# Patient Record
Sex: FEMALE | Race: BLACK OR AFRICAN AMERICAN | NOT HISPANIC OR LATINO | Employment: FULL TIME | URBAN - METROPOLITAN AREA
[De-identification: names, ages, dates, MRNs, and addresses within clinical notes are randomized per-mention and may not be internally consistent; named-entity substitution may affect disease eponyms.]

---

## 2020-03-26 ENCOUNTER — HOSPITAL ENCOUNTER (EMERGENCY)
Facility: HOSPITAL | Age: 25
Discharge: HOME | End: 2020-03-26
Payer: MEDICAID

## 2020-03-26 VITALS
HEIGHT: 65 IN | DIASTOLIC BLOOD PRESSURE: 72 MMHG | SYSTOLIC BLOOD PRESSURE: 125 MMHG | WEIGHT: 160 LBS | OXYGEN SATURATION: 100 % | BODY MASS INDEX: 26.66 KG/M2 | RESPIRATION RATE: 16 BRPM | TEMPERATURE: 98 F | HEART RATE: 87 BPM

## 2020-03-26 DIAGNOSIS — S61.211A LACERATION OF LEFT INDEX FINGER WITHOUT FOREIGN BODY WITHOUT DAMAGE TO NAIL, INITIAL ENCOUNTER: Primary | ICD-10-CM

## 2020-03-26 LAB
B-HCG UR QL: NEGATIVE
CTP QC/QA: YES

## 2020-03-26 PROCEDURE — 25000003 PHARM REV CODE 250: Performed by: PHYSICIAN ASSISTANT

## 2020-03-26 PROCEDURE — 12001 RPR S/N/AX/GEN/TRNK 2.5CM/<: CPT

## 2020-03-26 PROCEDURE — 99283 EMERGENCY DEPT VISIT LOW MDM: CPT | Mod: 25

## 2020-03-26 PROCEDURE — 81025 URINE PREGNANCY TEST: CPT | Performed by: PHYSICIAN ASSISTANT

## 2020-03-26 RX ORDER — IBUPROFEN 400 MG/1
800 TABLET ORAL
Status: COMPLETED | OUTPATIENT
Start: 2020-03-26 | End: 2020-03-26

## 2020-03-26 RX ORDER — LIDOCAINE HYDROCHLORIDE 10 MG/ML
5 INJECTION INFILTRATION; PERINEURAL
Status: COMPLETED | OUTPATIENT
Start: 2020-03-26 | End: 2020-03-26

## 2020-03-26 RX ORDER — BACITRACIN ZINC 500 UNIT/G
OINTMENT (GRAM) TOPICAL 2 TIMES DAILY
Qty: 30 G | Refills: 0 | Status: SHIPPED | OUTPATIENT
Start: 2020-03-26

## 2020-03-26 RX ADMIN — IBUPROFEN 800 MG: 400 TABLET, FILM COATED ORAL at 10:03

## 2020-03-26 RX ADMIN — LIDOCAINE HYDROCHLORIDE 5 ML: 10 INJECTION, SOLUTION INFILTRATION; PERINEURAL at 10:03

## 2020-03-26 RX ADMIN — NEOMYCIN-BACITRACIN-POLYMYXIN OINT 1 EACH: OINTMENT at 10:03

## 2020-03-27 NOTE — ED TRIAGE NOTES
Pt presents to ED c/o laceration to L hand index finger. Pt states she cut finger on  at approx 1800. Unsure when last tetanus shot was.

## 2020-03-27 NOTE — DISCHARGE INSTRUCTIONS
Keep area clean and dry.  You may wash gently with mild soap and water and pat dry immediately.  Avoid soaking underwater.  Apply antibiotic ointment twice daily for prevention of infection.  You may take over-the-counter Tylenol and/or ibuprofen as directed as needed for pain relief.  Follow-up with your primary care provider or this department in 7-10 days for suture removal.  Return to the ED for any concerning symptoms.    Our goal in the emergency department is to always give you outstanding care and exceptional service. You may receive a survey by mail or e-mail in the next week regarding your experience in our ED. We would greatly appreciate your completing and returning the survey. Your feedback provides us with a way to recognize our staff who give very good care and it helps us learn how to improve when your experience was below our aspiration of excellence.

## 2020-07-21 ENCOUNTER — HOSPITAL ENCOUNTER (EMERGENCY)
Facility: HOSPITAL | Age: 25
Discharge: HOME | End: 2020-07-21
Attending: EMERGENCY MEDICINE
Payer: MEDICAID

## 2020-07-21 VITALS
WEIGHT: 150 LBS | RESPIRATION RATE: 18 BRPM | DIASTOLIC BLOOD PRESSURE: 68 MMHG | HEART RATE: 57 BPM | BODY MASS INDEX: 26.58 KG/M2 | OXYGEN SATURATION: 100 % | SYSTOLIC BLOOD PRESSURE: 110 MMHG | HEIGHT: 63 IN | TEMPERATURE: 99 F

## 2020-07-21 DIAGNOSIS — N76.0 BV (BACTERIAL VAGINOSIS): Primary | ICD-10-CM

## 2020-07-21 DIAGNOSIS — M54.50 ACUTE RIGHT-SIDED LOW BACK PAIN WITHOUT SCIATICA: ICD-10-CM

## 2020-07-21 DIAGNOSIS — B96.89 BV (BACTERIAL VAGINOSIS): Primary | ICD-10-CM

## 2020-07-21 LAB
B-HCG UR QL: POSITIVE
BACTERIA #/AREA URNS HPF: NORMAL /HPF
BACTERIA GENITAL QL WET PREP: ABNORMAL
BILIRUB UR QL STRIP: NEGATIVE
CLARITY UR: CLEAR
CLUE CELLS VAG QL WET PREP: ABNORMAL
COLOR UR: YELLOW
CTP QC/QA: YES
FILAMENT FUNGI VAG WET PREP-#/AREA: ABNORMAL
GLUCOSE UR QL STRIP: NEGATIVE
HGB UR QL STRIP: ABNORMAL
HYALINE CASTS #/AREA URNS LPF: 0 /LPF
KETONES UR QL STRIP: NEGATIVE
LEUKOCYTE ESTERASE UR QL STRIP: NEGATIVE
MICROSCOPIC COMMENT: NORMAL
NITRITE UR QL STRIP: NEGATIVE
PH UR STRIP: 6 [PH] (ref 5–8)
PROT UR QL STRIP: ABNORMAL
RBC #/AREA URNS HPF: 1 /HPF (ref 0–4)
SP GR UR STRIP: 1.02 (ref 1–1.03)
SPECIMEN SOURCE: ABNORMAL
SQUAMOUS #/AREA URNS HPF: 1 /HPF
T VAGINALIS GENITAL QL WET PREP: ABNORMAL
URN SPEC COLLECT METH UR: ABNORMAL
UROBILINOGEN UR STRIP-ACNC: NEGATIVE EU/DL
WBC #/AREA URNS HPF: 1 /HPF (ref 0–5)
WBC #/AREA VAG WET PREP: ABNORMAL
YEAST GENITAL QL WET PREP: ABNORMAL

## 2020-07-21 PROCEDURE — 99284 EMERGENCY DEPT VISIT MOD MDM: CPT

## 2020-07-21 PROCEDURE — 87491 CHLMYD TRACH DNA AMP PROBE: CPT

## 2020-07-21 PROCEDURE — 81000 URINALYSIS NONAUTO W/SCOPE: CPT

## 2020-07-21 PROCEDURE — 81025 URINE PREGNANCY TEST: CPT | Performed by: PHYSICIAN ASSISTANT

## 2020-07-21 PROCEDURE — 25000003 PHARM REV CODE 250: Performed by: NURSE PRACTITIONER

## 2020-07-21 PROCEDURE — 87210 SMEAR WET MOUNT SALINE/INK: CPT

## 2020-07-21 RX ORDER — ACETAMINOPHEN 500 MG
1000 TABLET ORAL
Status: COMPLETED | OUTPATIENT
Start: 2020-07-21 | End: 2020-07-21

## 2020-07-21 RX ADMIN — ACETAMINOPHEN 1000 MG: 500 TABLET ORAL at 02:07

## 2020-07-21 NOTE — Clinical Note
Jenna Lozada was seen and treated in our emergency department on 7/21/2020.  She may return with limitations on 07/22/2020.  Light duty, no heavy lifting.     Sincerely,      Baldomero Matthews, DNP

## 2020-07-21 NOTE — DISCHARGE INSTRUCTIONS
May use tylenol, heat, massage, biofreeze or capsaicin.  Return to the Emergency department for any worsening or failure to improve, otherwise follow up with your primary care provider.

## 2020-07-21 NOTE — ED TRIAGE NOTES
Pt. Reports right side lower back pain for the past 4 weeks. Pt. States pain intermitting. Pt. Reports she just found out that she is pregnant. Pt. Denies any fall, injuries or trauma, pt. Reports she works at [employer] but does not recall any injuries.

## 2020-07-22 NOTE — ED PROVIDER NOTES
Encounter Date: 7/21/2020       History     Chief Complaint   Patient presents with    Back Pain     muscle spasms.. heavy lifting at work .. lower back spasms.     HPI   Patient is a 25-year-old female who is approximately 5 weeks pregnant who states that she has right mid back pain that she believes may be due to heavy lifting at work.  The pain is exacerbated by movement, she tried icy Hot or similar product home without relief.  Current severity of pain is 10/10.  She denies dysuria, urgency, frequency, hematuria but endorses brown vaginal discharge.  [facility]    Review of patient's allergies indicates:  No Known Allergies  Past Medical History:   Diagnosis Date    Asthma     Bronchitis      History reviewed. No pertinent surgical history.  Family History   Problem Relation Age of Onset    Breast cancer Paternal Grandmother     Depression Neg Hx     Drug abuse Neg Hx     Early death Neg Hx     Hearing loss Neg Hx     Colon cancer Neg Hx     Ovarian cancer Neg Hx      Social History     Tobacco Use    Smoking status: Never Smoker    Smokeless tobacco: Never Used   Substance Use Topics    Alcohol use: No    Drug use: No     Review of Systems   Constitutional: Negative for chills, fatigue and fever.   HENT: Negative for congestion, ear discharge, ear pain, postnasal drip, rhinorrhea, sinus pressure, sneezing, sore throat and voice change.    Eyes: Negative for discharge and itching.   Respiratory: Negative for cough, shortness of breath and wheezing.    Cardiovascular: Negative for chest pain, palpitations and leg swelling.   Gastrointestinal: Negative for abdominal pain, constipation, diarrhea, nausea and vomiting.   Endocrine: Negative for polydipsia, polyphagia and polyuria.   Genitourinary: Positive for vaginal discharge. Negative for dysuria, frequency, hematuria, urgency, vaginal bleeding and vaginal pain.   Musculoskeletal: Positive for back pain.  Negative for arthralgias and myalgias.   Skin: Negative for rash and wound.   Neurological: Negative for dizziness, seizures, syncope, weakness and numbness.   Hematological: Negative for adenopathy. Does not bruise/bleed easily.   Psychiatric/Behavioral: Negative for self-injury and suicidal ideas. The patient is not nervous/anxious.        Physical Exam     Initial Vitals [07/21/20 1350]   BP Pulse Resp Temp SpO2   131/60 78 16 98.7 °F (37.1 °C) 99 %      MAP       --         Physical Exam    Nursing note and vitals reviewed.  Constitutional: She appears well-developed and well-nourished.   HENT:   Head: Normocephalic and atraumatic.   Right Ear: External ear normal.   Left Ear: External ear normal.   Nose: Nose normal.   Eyes: Conjunctivae and EOM are normal. Pupils are equal, round, and reactive to light. Right eye exhibits no discharge. Left eye exhibits no discharge.   Neck: Normal range of motion.   Abdominal: Soft. Normal appearance and bowel sounds are normal. She exhibits no distension. There is no abdominal tenderness. There is no CVA tenderness. Hernia confirmed negative in the right inguinal area and confirmed negative in the left inguinal area.   Genitourinary:    Uterus normal.   No labial fusion. There is no rash, tenderness, lesion or injury on the right labia. There is no rash, tenderness, lesion or injury on the left labia. Uterus is not deviated, not enlarged, not fixed and not tender. Cervix exhibits no motion tenderness, no discharge and no friability. Right adnexum displays no mass, no tenderness and no fullness. Left adnexum displays no mass, no tenderness and no fullness.    Vaginal discharge (white, thick) present.      No vaginal erythema, tenderness or bleeding.   No erythema, tenderness or bleeding in the vagina.    No foreign body in the vagina.      No signs of injury in the vagina.     Musculoskeletal: Normal range of motion.   Neurological: She is alert and oriented to person, place,  and time.   Skin: Skin is dry. Capillary refill takes less than 2 seconds.         ED Course   Procedures  Labs Reviewed   URINALYSIS, REFLEX TO URINE CULTURE - Abnormal; Notable for the following components:       Result Value    Protein, UA 2+ (*)     Occult Blood UA 1+ (*)     All other components within normal limits    Narrative:     Specimen Source->Urine   VAGINAL SCREEN - Abnormal; Notable for the following components:    Clue Cells Few (*)     WBC - Vaginal Screen Moderate (*)     Bacteria - Vaginal Screen Many (*)     All other components within normal limits   POCT URINE PREGNANCY - Abnormal; Notable for the following components:    POC Preg Test, Ur Positive (*)     All other components within normal limits   C. TRACHOMATIS/N. GONORRHOEAE BY AMP DNA   URINALYSIS MICROSCOPIC    Narrative:     Specimen Source->Urine          Imaging Results    None                APC / Resident Notes:   Patient is a 25-year-old female who presents with vaginal discharge and mid right-sided back pain during gestation. She was seen on 7/13/2020 by Dr. Cho, she was found to be rh pos (no need for rhogam), seen on 7/16/2020 by Dr. Montero, an early IUP was demonstrated with gestational sac.  On PE pt is afebrile and nontoxic in nad.  Denies numb/tingling x4 ext, b/b incontinence.  Ddx: pyelo, uti, musculoskeletal back pain, std, vaginal bleeding. I have ordered gc/chlamydia, vag screen, ua, poct upt.              ED Course as of Jul 21 1953   Tue Jul 21, 2020   1405 Preg Test, Ur(!): Positive [VC]   1504 Neg for uti.   Urinalysis Microscopic [VC]   1504 Pending at time of disposition.   C. trachomatis/N. gonorrhoeae by AMP DNA [facility]; Vagina [VC]   1551 Pos for bv, will treat and culture.   Vaginal Screen Vagina(!) [VC]      ED Course User Index  [VC] Baldoemro Matthews DNP     Patient was given Tylenol for the relief of her discomfort.  She is discharged home in good condition and should follow up with her OBGYN for  treatment of bacterial vaginosis at the discretion of the provider.  I explained that Flagyl is the usual treatment for this and it has been problematic and previous studies.  Patient understands she may use Tylenol, topical remedies better over-the-counter, moist heat, massage and follow up with primary care.  She should return for any worsening or changes in condition.See above for analyses of radiology, labs, and events during pt's visit and direct actions taken. Symptomatic therapies and return precautions on AVS.   Medication choices were made after reviewing allergies, medications, history, available laboratories. See below for discharge prescriptions if any and disposition.             Clinical Impression:       ICD-10-CM ICD-9-CM   1. BV (bacterial vaginosis)  N76.0 616.10    B96.89 041.9   2. Acute right-sided low back pain without sciatica  M54.5 724.2         Disposition:   Disposition: Discharged  Condition: Stable     ED Disposition Condition    Discharge Stable        ED Prescriptions     None        Follow-up Information     Follow up With Specialties Details Why Contact Info    Miracle Montero MD Obstetrics and Gynecology Schedule an appointment as soon as possible for a visit  for bacterial vaginosis [address]  [address]  [facility] [id number] [phone]      Andrea Vaughn MD Neonatology Schedule an appointment as soon as possible for a visit  back pain/spasm [address] [facility] [address]  [address]  [facility] [address] [phone]

## 2020-07-27 LAB
C TRACH DNA SPEC QL NAA+PROBE: NOT DETECTED
N GONORRHOEA DNA SPEC QL NAA+PROBE: NOT DETECTED

## 2020-08-06 ENCOUNTER — HOSPITAL ENCOUNTER (EMERGENCY)
Facility: HOSPITAL | Age: 25
Discharge: HOME | End: 2020-08-06
Attending: EMERGENCY MEDICINE
Payer: MEDICAID

## 2020-08-06 VITALS
SYSTOLIC BLOOD PRESSURE: 120 MMHG | TEMPERATURE: 98 F | HEIGHT: 63 IN | OXYGEN SATURATION: 99 % | HEART RATE: 92 BPM | RESPIRATION RATE: 18 BRPM | BODY MASS INDEX: 26.58 KG/M2 | WEIGHT: 150 LBS | DIASTOLIC BLOOD PRESSURE: 56 MMHG

## 2020-08-06 DIAGNOSIS — S61.215A LACERATION OF LEFT RING FINGER WITHOUT FOREIGN BODY WITHOUT DAMAGE TO NAIL, INITIAL ENCOUNTER: Primary | ICD-10-CM

## 2020-08-06 PROCEDURE — 99283 EMERGENCY DEPT VISIT LOW MDM: CPT | Mod: 25

## 2020-08-06 PROCEDURE — 12001 RPR S/N/AX/GEN/TRNK 2.5CM/<: CPT

## 2020-08-06 PROCEDURE — 25000003 PHARM REV CODE 250: Performed by: NURSE PRACTITIONER

## 2020-08-06 RX ORDER — ACETAMINOPHEN 325 MG/1
650 TABLET ORAL
Status: COMPLETED | OUTPATIENT
Start: 2020-08-06 | End: 2020-08-06

## 2020-08-06 RX ORDER — LIDOCAINE HYDROCHLORIDE 10 MG/ML
10 INJECTION INFILTRATION; PERINEURAL
Status: COMPLETED | OUTPATIENT
Start: 2020-08-06 | End: 2020-08-06

## 2020-08-06 RX ADMIN — LIDOCAINE HYDROCHLORIDE 10 ML: 10 INJECTION, SOLUTION INFILTRATION; PERINEURAL at 03:08

## 2020-08-06 RX ADMIN — ACETAMINOPHEN 650 MG: 325 TABLET ORAL at 03:08

## 2020-08-06 NOTE — ED PROVIDER NOTES
Encounter Date: 8/6/2020       History     Chief Complaint   Patient presents with    Laceration     Pt reports she sliced her LEFT ring finger with a box cuter around 1100 today. Bleeding controlled. Pain is 10/10, reports tingling     CC:  Laceration    Ms. Lozada, a 25 y.o. female patient, presents to the ED with a laceration. The patient reports having cut her left, distal, ring finger with a  approx. 1 hour ago.  She stated that it was a new, clean  bleed.  The patient reports bleeding and pain to her affected finger. The patient is currently 7 weeks pregnant, is taking prenatal vitamins, and denies the use of any other medications. The patient denies any allergies to medications and is UTD on immunizations.     The history is provided by the patient. No  was used.     Review of patient's allergies indicates:  No Known Allergies  Past Medical History:   Diagnosis Date    Asthma     Bronchitis      History reviewed. No pertinent surgical history.  Family History   Problem Relation Age of Onset    Breast cancer Paternal Grandmother     Depression Neg Hx     Drug abuse Neg Hx     Early death Neg Hx     Hearing loss Neg Hx     Colon cancer Neg Hx     Ovarian cancer Neg Hx      Social History     Tobacco Use    Smoking status: Never Smoker    Smokeless tobacco: Never Used   Substance Use Topics    Alcohol use: No    Drug use: No     Review of Systems   Constitutional: Negative for fever.   Gastrointestinal: Negative for vomiting.   Musculoskeletal: Negative for arthralgias and back pain.   Skin: Positive for wound (Laceration). Negative for rash.   Neurological: Negative for syncope and numbness.   Psychiatric/Behavioral: Negative for confusion.       Physical Exam     Initial Vitals [08/06/20 1450]   BP Pulse Resp Temp SpO2   (!) 120/56 92 18 98.3 °F (36.8 °C) 99 %      MAP       --         Physical Exam    Nursing note and vitals reviewed.  Constitutional: She  appears well-developed and well-nourished. She is not diaphoretic. She is cooperative.  Non-toxic appearance. No distress.   HENT:   Head: Normocephalic and atraumatic.   Right Ear: External ear normal.   Left Ear: External ear normal.   Mouth/Throat: No trismus in the jaw.   Eyes: Conjunctivae and EOM are normal.   Neck: Normal range of motion. No tracheal deviation present.   Cardiovascular: Normal rate, regular rhythm and intact distal pulses.   Pulses:       Radial pulses are 2+ on the left side.   Pulmonary/Chest: Effort normal and breath sounds normal. No stridor. No tachypnea and no bradypnea. No respiratory distress.   Musculoskeletal: Normal range of motion.   Neurological: She is alert and oriented to person, place, and time. She has normal strength. No sensory deficit. Coordination normal. GCS score is 15. GCS eye subscore is 4. GCS verbal subscore is 5. GCS motor subscore is 6.   Skin: Skin is warm, dry and intact. No bruising and no rash noted. No cyanosis or erythema. Nails show no clubbing.   Psychiatric: She has a normal mood and affect. Her behavior is normal. Judgment and thought content normal.         ED Course   Lac Repair    Date/Time: 8/6/2020 4:00 PM  Performed by: GARO Bales  Authorized by: Robin Monsivais MD   Consent Done: Yes  Consent: Verbal consent obtained.  Risks and benefits: risks, benefits and alternatives were discussed  Consent given by: patient  Patient understanding: patient states understanding of the procedure being performed  Patient identity confirmed: verbally with patient  Body area: upper extremity  Location details: left long finger  Laceration length: 2 cm  Tendon involvement: none  Nerve involvement: none  Anesthesia: digital block    Anesthesia:  Local Anesthetic: lidocaine 1% with epinephrine  Anesthetic total: 3 mL  Patient sedated: no  Preparation: Patient was prepped and draped in the usual sterile fashion.  Irrigation solution: saline  Irrigation  method: syringe  Amount of cleaning: standard  Debridement: none  Degree of undermining: none  Skin closure: 5-0 nylon  Number of sutures: 6  Technique: simple  Approximation: close  Approximation difficulty: simple  Dressing: antibiotic ointment  Patient tolerance: Patient tolerated the procedure well with no immediate complications        Labs Reviewed - No data to display       Imaging Results    None                APC / Resident Notes:   This is an evaluation of a 25 y.o. female that presents to the Emergency Department for a Laceration. Physical Exam shows a non-toxic, afebrile, and well appearing female. There is a laceration to the left distal 4th finger tip (med-lat).  Does not involve the nail bed.  Does not involve the nail plate. There is no surrounding erythema or area of increased warmth.  Finger compartments are soft. There is full ROM of the MCP, PIP, and DIP joints against resistance. Equal sensation to the extremity. No visible tendon lacerations observed on exam.  The wound was irrigated and visually inspected prior to closure. No visible foreign bodies noted on inspection prior to wound closure. Vital Signs Are Reassuring.The wound was closed per the procedure note.     My overall impression is Laceration of the distal finger tip of left 4th finger. I considered, but at this time, do not suspect cellulitis, compartment syndrome, underlying fracture, tendon injury, or suspect any retained foreign body at this time.     ED Course:  Tylenol. D/C Information: Laceration and Suture Removal instructions given. The diagnosis, treatment plan, instructions for follow-up and reevaluation with her PCP or Return to ED for suture removal as well as ED return precautions were discussed and understanding was verbalized. All questions or concerns have been addressed.  NAVID Edwards, FNP-C                            Clinical Impression:       ICD-10-CM ICD-9-CM   1. Laceration of left ring finger without  foreign body without damage to nail, initial encounter  S61.215A 883.0         Disposition:   Disposition: Discharged  Condition: Stable     ED Disposition Condition    Discharge Stable        ED Prescriptions     None        Follow-up Information     Follow up With Specialties Details Why Contact Info    Your Primary Care Doctor  Schedule an appointment as soon as possible for a visit  Please call and schedule an appointment for follow up in 7 days for suture removal     [facility] Emergency [facility] Go to  If symptoms worsen or in 7 days for suture removal [address] [facility] [id number] [phone]                                     Misha Feldman, GATOP  08/06/20 1603

## 2020-08-06 NOTE — ED NOTES
Bed: [facility]  Expected date:   Expected time:   Means of arrival:   Comments:     Demetri Tucker, Patient Care Assistant  08/06/20 7436

## 2020-08-10 ENCOUNTER — PATIENT OUTREACH (OUTPATIENT)
Dept: EMERGENCY MEDICINE | Facility: HOSPITAL | Age: 25
End: 2020-08-10

## 2020-10-16 ENCOUNTER — HOSPITAL ENCOUNTER (EMERGENCY)
Facility: HOSPITAL | Age: 25
Discharge: HOME | End: 2020-10-16
Attending: EMERGENCY MEDICINE
Payer: MEDICAID

## 2020-10-16 VITALS
DIASTOLIC BLOOD PRESSURE: 55 MMHG | OXYGEN SATURATION: 99 % | WEIGHT: 168 LBS | TEMPERATURE: 98 F | RESPIRATION RATE: 18 BRPM | SYSTOLIC BLOOD PRESSURE: 100 MMHG | BODY MASS INDEX: 29.77 KG/M2 | HEIGHT: 63 IN | HEART RATE: 77 BPM

## 2020-10-16 DIAGNOSIS — Z3A.20 20 WEEKS GESTATION OF PREGNANCY: ICD-10-CM

## 2020-10-16 DIAGNOSIS — R10.13 EPIGASTRIC PAIN: Primary | ICD-10-CM

## 2020-10-16 LAB
BILIRUBIN, POC UA: NEGATIVE
BLOOD, POC UA: ABNORMAL
CLARITY, POC UA: CLEAR
COLOR, POC UA: YELLOW
GLUCOSE, POC UA: ABNORMAL
KETONES, POC UA: NEGATIVE
LEUKOCYTE EST, POC UA: NEGATIVE
NITRITE, POC UA: NEGATIVE
PH UR STRIP: 8.5 [PH]
POCT GLUCOSE: 97 MG/DL (ref 70–110)
PROTEIN, POC UA: ABNORMAL
SPECIFIC GRAVITY, POC UA: 1.02
UROBILINOGEN, POC UA: 0.2 E.U./DL

## 2020-10-16 PROCEDURE — 99283 EMERGENCY DEPT VISIT LOW MDM: CPT | Mod: ER

## 2020-10-16 PROCEDURE — 81003 URINALYSIS AUTO W/O SCOPE: CPT | Mod: ER

## 2020-10-16 PROCEDURE — 82962 GLUCOSE BLOOD TEST: CPT | Mod: ER

## 2020-10-16 NOTE — ED PROVIDER NOTES
Encounter Date: 10/16/2020    SCRIBE #1 NOTE: I, Fiona Bernabe, am scribing for, and in the presence of,  GARO Denise. I have scribed the following portions of the note - Other sections scribed: HPI, ROS, PE.       History     Chief Complaint   Patient presents with    Abdominal Pain     EPIGASTRIC PAIN X 30 MINUTES WORSENS WHEN SITTING; DENIES N/V     This is a nontoxic appearing 25 y.o. female with 5/10 epigastric abdominal pain x today after lifting heavy boxes at work. Patient is 5 months pregnant (, P:1, A:0). Endorses nausea. Denies pain at present. Denies vaginal bleeding, dysuria, back pain, and vomiting. Reports seeing OB/GYN Dr. Johnson last week who did an US. Ultrasound was normal.     The history is provided by the patient. No  was used.   Abdominal Pain  The current episode started just prior to arrival. The problem has not changed since onset.The abdominal pain is located in the epigastric region. The abdominal pain does not radiate. The severity of the abdominal pain is 5/10. The abdominal pain is relieved by nothing. The other symptoms of the illness include nausea. The other symptoms of the illness do not include shortness of breath, vomiting, dysuria or vaginal bleeding.   Nausea began today.   The patient states that she believes she is currently not pregnant. The patient has not had a change in bowel habit. Symptoms associated with the illness do not include chills or back pain.     Review of patient's allergies indicates:  No Known Allergies  Past Medical History:   Diagnosis Date    Asthma     Bronchitis      History reviewed. No pertinent surgical history.  Family History   Problem Relation Age of Onset    Breast cancer Paternal Grandmother     Depression Neg Hx     Drug abuse Neg Hx     Early death Neg Hx     Hearing loss Neg Hx     Colon cancer Neg Hx     Ovarian cancer Neg Hx      Social History     Tobacco Use    Smoking status: Never Smoker     Smokeless tobacco: Never Used   Substance Use Topics    Alcohol use: No    Drug use: No     Review of Systems   Constitutional: Negative.  Negative for chills.   HENT: Negative.    Eyes: Negative.    Respiratory: Negative.  Negative for shortness of breath.    Cardiovascular: Negative.  Negative for chest pain.   Gastrointestinal: Positive for abdominal pain and nausea. Negative for vomiting.   Endocrine: Negative.    Genitourinary: Negative.  Negative for dysuria and vaginal bleeding.   Musculoskeletal: Negative.  Negative for back pain.   Skin: Negative.    Allergic/Immunologic: Negative.    Neurological: Negative.    Hematological: Negative.    Psychiatric/Behavioral: Negative.    All other systems reviewed and are negative.      Physical Exam     Initial Vitals [10/16/20 1103]   BP Pulse Resp Temp SpO2   (!) 160/90 77 18 98 °F (36.7 °C) 99 %      MAP       --         Physical Exam    Nursing note and vitals reviewed.  Constitutional: She appears well-developed.   HENT:   Right Ear: External ear normal.   Left Ear: External ear normal.   Nose: Nose normal.   Mouth/Throat: Oropharynx is clear and moist.   Eyes: Conjunctivae are normal.   Neck: Normal range of motion. Neck supple.   Cardiovascular: Normal rate, regular rhythm, S1 normal, S2 normal, normal heart sounds and intact distal pulses.   No murmur heard.  Pulmonary/Chest: Effort normal and breath sounds normal.   Abdominal: Soft. Bowel sounds are normal. There is no abdominal tenderness. There is no guarding.   Musculoskeletal: Normal range of motion. No tenderness or edema.   Neurological: She is alert and oriented to person, place, and time.   Skin: Skin is warm and dry. No rash noted.   Psychiatric: She has a normal mood and affect. Her behavior is normal.         ED Course   Procedures  Labs Reviewed   POCT URINALYSIS W/O SCOPE - Abnormal; Notable for the following components:       Result Value    Glucose, UA Trace (*)     Blood, UA Trace-lysed (*)      Protein, UA 1+ (*)     All other components within normal limits   POCT URINALYSIS W/O SCOPE   POCT GLUCOSE MONITORING CONTINUOUS   POCT GLUCOSE          Imaging Results    None          Medical Decision Making:   History:   Old Medical Records: I decided to obtain old medical records.  Initial Assessment:   25 y.o. female with 5/10 epigastric abdominal pain x today after lifting heavy boxes at work. Patient is 5 months pregnant (, P:1, A:0). Denies pain at present. Denies feeling a similar pain before. Denies vaginal bleeding, dysuria, back pain, and vomiting.  Differential Diagnosis:   Muscle sprain, UTI, pyelonephritis  Clinical Tests:   Lab Tests: Ordered and Reviewed  ED Management:  Physical with no abdominal tenderness. No guarding.   U/A with no evidence of infection.   .  Pt re-examined by Dr. Chappell. She stated that abdominal pain had resolved and she would like to be discharged.   ischarged with Tylenol prn.              Scribe Attestation:   Scribe #1: I performed the above scribed service and the documentation accurately describes the services I performed. I attest to the accuracy of the note.    This document was produced by a scribe under my direction and in my presence. I agree with the content of the note and have made any necessary edits.     GARO Denise    10/17/2020 6:58 PM                  Clinical Impression:     ICD-10-CM ICD-9-CM   1. Epigastric pain  R10.13 789.06   2. 20 weeks gestation of pregnancy  Z3A.20 V22.2                          ED Disposition Condition    Discharge Stable        ED Prescriptions     None        Follow-up Information     Follow up With Specialties Details Why Contact Info    Jose Johnson MD Obstetrics and Gynecology In 2 days  [address]  [address]  [facility] [address]  [phone]                                         GARO Dong  10/17/20 3761

## 2020-11-12 ENCOUNTER — HOSPITAL ENCOUNTER (EMERGENCY)
Facility: HOSPITAL | Age: 25
Discharge: HOME | End: 2020-11-12
Attending: EMERGENCY MEDICINE
Payer: MEDICAID

## 2020-11-12 VITALS
OXYGEN SATURATION: 99 % | HEIGHT: 60 IN | BODY MASS INDEX: 33.38 KG/M2 | TEMPERATURE: 99 F | DIASTOLIC BLOOD PRESSURE: 60 MMHG | WEIGHT: 170 LBS | SYSTOLIC BLOOD PRESSURE: 118 MMHG | HEART RATE: 92 BPM | RESPIRATION RATE: 18 BRPM

## 2020-11-12 DIAGNOSIS — J40 BRONCHITIS: Primary | ICD-10-CM

## 2020-11-12 LAB
BILIRUBIN, POC UA: NEGATIVE
BLOOD, POC UA: NEGATIVE
CLARITY, POC UA: CLEAR
COLOR, POC UA: YELLOW
CTP QC/QA: YES
GLUCOSE, POC UA: NEGATIVE
INFLUENZA A ANTIGEN, POC: NEGATIVE
INFLUENZA B ANTIGEN, POC: NEGATIVE
KETONES, POC UA: NEGATIVE
LEUKOCYTE EST, POC UA: NEGATIVE
NITRITE, POC UA: NEGATIVE
PH UR STRIP: 7.5 [PH]
PROTEIN, POC UA: ABNORMAL
SARS-COV-2 RDRP RESP QL NAA+PROBE: NEGATIVE
SPECIFIC GRAVITY, POC UA: >=1.03
UROBILINOGEN, POC UA: 1 E.U./DL

## 2020-11-12 PROCEDURE — U0002 COVID-19 LAB TEST NON-CDC: HCPCS | Mod: ER | Performed by: EMERGENCY MEDICINE

## 2020-11-12 PROCEDURE — 81003 URINALYSIS AUTO W/O SCOPE: CPT | Mod: ER

## 2020-11-12 PROCEDURE — 87502 INFLUENZA DNA AMP PROBE: CPT | Mod: ER

## 2020-11-12 PROCEDURE — 99284 EMERGENCY DEPT VISIT MOD MDM: CPT | Mod: 25,ER

## 2020-11-12 RX ORDER — ALBUTEROL SULFATE 90 UG/1
1-2 AEROSOL, METERED RESPIRATORY (INHALATION) EVERY 6 HOURS PRN
Qty: 18 G | Refills: 0 | Status: SHIPPED | OUTPATIENT
Start: 2020-11-12 | End: 2021-11-12

## 2020-11-12 RX ORDER — PREDNISONE 50 MG/1
50 TABLET ORAL DAILY
Qty: 5 TABLET | Refills: 0 | Status: SHIPPED | OUTPATIENT
Start: 2020-11-12 | End: 2020-11-17

## 2020-11-12 RX ORDER — CETIRIZINE HYDROCHLORIDE 10 MG/1
10 TABLET ORAL DAILY
Qty: 30 TABLET | Refills: 0 | Status: SHIPPED | OUTPATIENT
Start: 2020-11-12 | End: 2021-11-12

## 2020-11-12 RX ORDER — GUAIFENESIN 100 MG/5ML
100-200 SOLUTION ORAL EVERY 4 HOURS PRN
Qty: 60 ML | Refills: 0 | Status: SHIPPED | OUTPATIENT
Start: 2020-11-12 | End: 2020-11-22

## 2020-11-12 RX ORDER — AZITHROMYCIN 250 MG/1
250 TABLET, FILM COATED ORAL DAILY
Qty: 6 TABLET | Refills: 0 | Status: SHIPPED | OUTPATIENT
Start: 2020-11-12

## 2020-11-12 NOTE — DISCHARGE INSTRUCTIONS
Thank you for coming to our Emergency Department today. It is important to remember that some problems are difficult to diagnose and may not be found during your first visit. Be sure to follow up with your primary care doctor and review any labs/imaging that was performed with them. If you do not have a primary care doctor, you may contact the one listed on your discharge paperwork or you may also call the [facility] Appointment Desk at [phone] to schedule an appointment with one.     All medications may potentially have side effects and it is impossible to predict which medications may give you side effects. If you feel that you are having a negative effect of any medication you should immediately stop taking them and seek medical attention.    Return to the ER with any questions/concerns, new/concerning symptoms, worsening or failure to improve. Do not drive or make any important decisions for 24 hours if you have received any pain medications, sedatives or mood altering drugs during your ER visit.

## 2020-11-12 NOTE — ED PROVIDER NOTES
Encounter Date: 11/12/2020       History     Chief Complaint   Patient presents with    Cough     pt presents to ED with c/o nasal congestion with yellow/green drainage and cough x3 days. Pt is 22wks pregnant but denies any abd, back or pelvic pain at this time.     Nasal Congestion     25 y.o. female Past Medical History:  No date: Asthma  No date: Bronchitis     g2pt currently 22 wks pregnant,     Presents for evaluation of 4-5 days mild headache without neck stiffness/photophobia, +nasal congestion, sore throat, cough prod discolored sputum, sneezing, denies n/v/d, body aches or other symptoms. Pt states she was a long time smoker but quit.        Review of patient's allergies indicates:  No Known Allergies  Past Medical History:   Diagnosis Date    Asthma     Bronchitis      History reviewed. No pertinent surgical history.  Family History   Problem Relation Age of Onset    Breast cancer Paternal Grandmother     Depression Neg Hx     Drug abuse Neg Hx     Early death Neg Hx     Hearing loss Neg Hx     Colon cancer Neg Hx     Ovarian cancer Neg Hx      Social History     Tobacco Use    Smoking status: Never Smoker    Smokeless tobacco: Never Used   Substance Use Topics    Alcohol use: No    Drug use: No     Review of Systems   Constitutional: Negative for fever.   HENT: Positive for sneezing and sore throat.    Respiratory: Positive for cough. Negative for shortness of breath.    Cardiovascular: Negative for chest pain.   Gastrointestinal: Negative for nausea.   Genitourinary: Negative for dysuria.   Musculoskeletal: Negative for back pain.   Skin: Negative for rash.   Neurological: Negative for weakness.   Hematological: Does not bruise/bleed easily.   All other systems reviewed and are negative.      Physical Exam     Initial Vitals [11/12/20 1018]   BP Pulse Resp Temp SpO2   (!) 111/56 102 16 98.3 °F (36.8 °C) 99 %      MAP       --         Physical Exam    Nursing note and vitals  reviewed.  Constitutional: She appears well-developed and well-nourished.   HENT:   Head: Normocephalic and atraumatic.   Eyes: Conjunctivae and EOM are normal. Pupils are equal, round, and reactive to light.   Neck: Normal range of motion.   Cardiovascular: Normal rate and regular rhythm.   Pulmonary/Chest: Breath sounds normal. No respiratory distress.   Abdominal: She exhibits no distension.   Musculoskeletal: Normal range of motion.   Neurological: She is alert. No cranial nerve deficit. GCS score is 15. GCS eye subscore is 4. GCS verbal subscore is 5. GCS motor subscore is 6.   Skin: Skin is warm and dry.   Psychiatric: She has a normal mood and affect. Thought content normal.       Gravid  No resp distress  ED Course   Procedures  Labs Reviewed   POCT URINALYSIS W/O SCOPE - Abnormal; Notable for the following components:       Result Value    Spec Grav UA >=1.030 (*)     Protein, UA 2+ (*)     All other components within normal limits   SARS-COV-2 RDRP GENE   POCT URINALYSIS W/O SCOPE   POCT INFLUENZA A/B MOLECULAR   POCT RAPID INFLUENZA A/B          Imaging Results    None                             will check covid/flu and ua. Pt symptoms c/w bronchitis       Will treat with zithromax, albuterol, prednisone, zyrtec.  Clinical Impression:     ICD-10-CM ICD-9-CM   1. Bronchitis  J40 490                          ED Disposition Condition    Discharge Stable        ED Prescriptions     Medication Sig Dispense Start Date End Date Auth. Provider    predniSONE (DELTASONE) 50 MG Tab Take 1 tablet (50 mg total) by mouth once daily. for 5 days 5 tablet 11/12/2020 11/17/2020 Selam Goldman MD    cetirizine (ZYRTEC) 10 MG tablet Take 1 tablet (10 mg total) by mouth once daily. 30 tablet 11/12/2020 11/12/2021 Selam Goldman MD    guaifenesin 100 mg/5 ml (ROBITUSSIN) 100 mg/5 mL syrup Take 5-10 mLs (100-200 mg total) by mouth every 4 (four) hours as needed for Cough. 60 mL 11/12/2020 11/22/2020  Selam Goldman MD    azithromycin (Z-GABRIELE) 250 MG tablet Take 1 tablet (250 mg total) by mouth once daily. Take first 2 tablets together, then 1 every day until finished. 6 tablet 11/12/2020  Selam Goldman MD    albuterol (PROVENTIL/VENTOLIN HFA) 90 mcg/actuation inhaler Inhale 1-2 puffs into the lungs every 6 (six) hours as needed. Rescue 18 g 11/12/2020 11/12/2021 Selam Goldman MD        Follow-up Information     Follow up With Specialties Details Why Contact Info    Andrea Vaughn MD Neonatology   [address] [place] [id number] [phone]                                         Selam Goldman MD  11/12/20 7541

## 2021-04-16 ENCOUNTER — PATIENT MESSAGE (OUTPATIENT)
Dept: RESEARCH | Facility: HOSPITAL | Age: 26
End: 2021-04-16

## 2021-05-21 NOTE — ED PROVIDER NOTES
Encounter Date: 3/26/2020       History     Chief Complaint   Patient presents with    Laceration     Laceration to L hand index finger, cut on  at approx 1800     Patient is a 25-year-old right-hand-dominant  female with no pertinent past medical history who presents to the ED for urgent evaluation of finger laceration.  Patient reports accidentally cutting the left index finger while using a  about 4 hr prior to arrival.  She reports cleaning the wound and wrapped it with a cotton swab and tape she had at home. She decided to present to the ED for persistent pain. No meds taken. Tetanus UTD. No other complaints, including cough, fever, chest pain, or N/V.     The history is provided by the patient.     Review of patient's allergies indicates:  No Known Allergies  Past Medical History:   Diagnosis Date    Asthma     Bronchitis      History reviewed. No pertinent surgical history.  Family History   Problem Relation Age of Onset    Breast cancer Paternal Grandmother     Depression Neg Hx     Drug abuse Neg Hx     Early death Neg Hx     Hearing loss Neg Hx     Colon cancer Neg Hx     Ovarian cancer Neg Hx      Social History     Tobacco Use    Smoking status: Never Smoker    Smokeless tobacco: Never Used   Substance Use Topics    Alcohol use: No    Drug use: No     Review of Systems   Constitutional: Negative for chills and fever.   HENT: Negative for sore throat.    Eyes: Negative for visual disturbance.   Respiratory: Negative for shortness of breath.    Cardiovascular: Negative for chest pain.   Gastrointestinal: Negative for nausea.   Genitourinary: Negative for dysuria.   Musculoskeletal: Negative for back pain.   Skin: Positive for wound. Negative for rash.   Neurological: Negative for weakness.   Hematological: Does not bruise/bleed easily.   Psychiatric/Behavioral: Negative for dysphoric mood.       Physical Exam     Initial Vitals [03/26/20 2207]   BP Pulse  Resp Temp SpO2   (!) 138/94 88 18 98.4 °F (36.9 °C) 100 %      MAP       --         Physical Exam    Nursing note and vitals reviewed.  Constitutional: She appears well-developed and well-nourished. She is not diaphoretic. No distress.   HENT:   Head: Normocephalic and atraumatic.   Eyes: Conjunctivae and EOM are normal. Pupils are equal, round, and reactive to light.   Neck: Normal range of motion. Neck supple.   Cardiovascular: Normal rate, regular rhythm, normal heart sounds and intact distal pulses.   Pulmonary/Chest: Breath sounds normal. No respiratory distress. She has no wheezes. She has no rales.   Musculoskeletal: Normal range of motion. She exhibits no edema or tenderness.   Well-approximated 2 cm laceration noted to the distal tip of the left 2nd digit. No nail involvement. Patient has full ROM of the finger and intact sensation.    Neurological: She is alert and oriented to person, place, and time. She has normal strength. GCS score is 15. GCS eye subscore is 4. GCS verbal subscore is 5. GCS motor subscore is 6.   Skin: Skin is warm and dry.   Psychiatric: She has a normal mood and affect. Thought content normal.         ED Course   Lac Repair  Date/Time: 3/26/2020 10:39 PM  Performed by: Darrian Moore PA-C  Authorized by: Simran Sullivan PA-C   Body area: upper extremity  Location details: left index finger  Laceration length: 1.5 cm  Foreign bodies: no foreign bodies  Tendon involvement: none  Nerve involvement: none  Vascular damage: no  Anesthesia: digital block    Anesthesia:  Local Anesthetic: lidocaine 1% without epinephrine  Anesthetic total: 2 mL  Patient sedated: no  Preparation: Patient was prepped and draped in the usual sterile fashion.  Irrigation solution: saline  Irrigation method: syringe  Amount of cleaning: standard  Debridement: minimal  Degree of undermining: none  Skin closure: 4-0 nylon  Number of sutures: 3  Technique: simple  Approximation: loose  Approximation difficulty:  Yes simple  Dressing: 4x4 sterile gauze, antibiotic ointment and pressure/compression dressing  Patient tolerance: Patient tolerated the procedure well with no immediate complications        Labs Reviewed   POCT URINE PREGNANCY          Imaging Results    None                APC / Resident Notes:   Pt presenting 2/2 laceration. No signs of NV compromise or arterial bleeding. Laceration was repaired without difficulty. Please see procedure note. Pain controlled, tolerating po, and able to ambulate. Patient instructed to return in 7-10 days for suture removal. No foreign bodies noted.   Cautious return precautions discussed with patient and/or family with understanding. Prompt f/u with primary care physician discussed. All questions answered. Patient comfortable with plan.                               Clinical Impression:       ICD-10-CM ICD-9-CM   1. Laceration of left index finger without foreign body without damage to nail, initial encounter S61.211A 883.0         Disposition:   Disposition: Discharged  Condition: Stable                        Simran Sullivan PA-C  03/26/20 8295

## 2021-08-09 ENCOUNTER — LAB VISIT (OUTPATIENT)
Dept: PRIMARY CARE CLINIC | Facility: CLINIC | Age: 26
End: 2021-08-09
Payer: MEDICAID

## 2021-08-09 DIAGNOSIS — Z20.822 ENCOUNTER FOR LABORATORY TESTING FOR COVID-19 VIRUS: ICD-10-CM

## 2021-08-09 PROCEDURE — U0005 INFEC AGEN DETEC AMPLI PROBE: HCPCS | Performed by: INTERNAL MEDICINE

## 2021-08-09 PROCEDURE — U0003 INFECTIOUS AGENT DETECTION BY NUCLEIC ACID (DNA OR RNA); SEVERE ACUTE RESPIRATORY SYNDROME CORONAVIRUS 2 (SARS-COV-2) (CORONAVIRUS DISEASE [COVID-19]), AMPLIFIED PROBE TECHNIQUE, MAKING USE OF HIGH THROUGHPUT TECHNOLOGIES AS DESCRIBED BY CMS-2020-01-R: HCPCS | Performed by: INTERNAL MEDICINE

## 2021-08-10 LAB
SARS-COV-2 RNA RESP QL NAA+PROBE: DETECTED
SARS-COV-2- CYCLE NUMBER: 20.44

## 2021-08-11 DIAGNOSIS — U07.1 COVID-19 VIRUS DETECTED: ICD-10-CM

## 2021-12-30 ENCOUNTER — LAB VISIT (OUTPATIENT)
Dept: PRIMARY CARE CLINIC | Facility: OTHER | Age: 26
End: 2021-12-30
Attending: INTERNAL MEDICINE
Payer: MEDICAID

## 2021-12-30 DIAGNOSIS — Z20.822 ENCOUNTER FOR LABORATORY TESTING FOR COVID-19 VIRUS: ICD-10-CM

## 2021-12-30 PROCEDURE — U0003 INFECTIOUS AGENT DETECTION BY NUCLEIC ACID (DNA OR RNA); SEVERE ACUTE RESPIRATORY SYNDROME CORONAVIRUS 2 (SARS-COV-2) (CORONAVIRUS DISEASE [COVID-19]), AMPLIFIED PROBE TECHNIQUE, MAKING USE OF HIGH THROUGHPUT TECHNOLOGIES AS DESCRIBED BY CMS-2020-01-R: HCPCS | Performed by: INTERNAL MEDICINE

## 2021-12-31 ENCOUNTER — PATIENT MESSAGE (OUTPATIENT)
Dept: ADMINISTRATIVE | Facility: OTHER | Age: 26
End: 2021-12-31
Payer: MEDICAID

## 2022-01-01 LAB
SARS-COV-2 RNA RESP QL NAA+PROBE: NOT DETECTED
SARS-COV-2- CYCLE NUMBER: NORMAL

## 2022-02-04 ENCOUNTER — HOSPITAL ENCOUNTER (OUTPATIENT)
Facility: HOSPITAL | Age: 27
Discharge: HOME | End: 2022-02-04
Attending: OBSTETRICS & GYNECOLOGY | Admitting: OBSTETRICS & GYNECOLOGY
Payer: MEDICAID

## 2022-02-04 VITALS
RESPIRATION RATE: 18 BRPM | SYSTOLIC BLOOD PRESSURE: 108 MMHG | OXYGEN SATURATION: 100 % | TEMPERATURE: 98 F | DIASTOLIC BLOOD PRESSURE: 62 MMHG | HEART RATE: 83 BPM

## 2022-02-04 DIAGNOSIS — R10.13 EPIGASTRIC ABDOMINAL PAIN: ICD-10-CM

## 2022-02-04 PROCEDURE — 59025 FETAL NON-STRESS TEST: CPT

## 2022-02-04 PROCEDURE — 99211 OFF/OP EST MAY X REQ PHY/QHP: CPT | Mod: 25

## 2022-02-04 PROCEDURE — 25000003 PHARM REV CODE 250: Performed by: OBSTETRICS & GYNECOLOGY

## 2022-02-04 RX ORDER — FAMOTIDINE 20 MG/1
40 TABLET, FILM COATED ORAL ONCE
Status: COMPLETED | OUTPATIENT
Start: 2022-02-04 | End: 2022-02-04

## 2022-02-04 RX ORDER — DIPHENHYDRAMINE HCL 25 MG
25 CAPSULE ORAL ONCE
Status: DISCONTINUED | OUTPATIENT
Start: 2022-02-04 | End: 2022-02-04 | Stop reason: HOSPADM

## 2022-02-04 RX ADMIN — FAMOTIDINE 40 MG: 20 TABLET ORAL at 03:02

## 2022-02-04 NOTE — NURSING
0300On arrival pt had diarrhea and sat in the restroom. Was instructed to hit call bell when done. FHM placed and pt given pepcid.     0344: No bowel movement since 0300

## 2022-02-04 NOTE — DISCHARGE INSTRUCTIONS
Home Undelivered Discharge Instructions    After Discharge Orders:    No future appointments.    Call physician to follow up and for instructions     Current Discharge Medication List      CONTINUE these medications which have NOT CHANGED    Details   albuterol (ACCUNEB) 0.63 mg/3 mL Nebu Take 0.63 mg by nebulization every 6 (six) hours as needed.      azithromycin (Z-GABRIELE) 250 MG tablet Take 1 tablet (250 mg total) by mouth once daily. Take first 2 tablets together, then 1 every day until finished.  Qty: 6 tablet, Refills: 0      bacitracin 500 unit/gram Oint Apply topically 2 (two) times daily.  Qty: 30 g, Refills: 0      cetirizine (ZYRTEC) 10 MG tablet Take 1 tablet (10 mg total) by mouth once daily.  Qty: 30 tablet, Refills: 0      ferrous sulfate 325 mg (65 mg iron) Tab tablet Take 1 tablet (325 mg total) by mouth once daily.  Qty: 30 tablet, Refills: 5      ibuprofen (ADVIL,MOTRIN) 600 MG tablet Take 1 tablet (600 mg total) by mouth every 8 (eight) hours as needed for Pain.  Qty: 42 tablet, Refills: 0      norethindrone-ethinyl estradiol (LOESTRIN 1/20, 21,) 1-20 mg-mcg per tablet Take 1 tablet by mouth once daily.  Qty: 30 tablet, Refills: 6                     · Diet:  normal diet as tolerated    · Rest: normal activity as tolerated    Other instructions: Do kick counts once a day on your baby. Choose the time of day your baby is most active. Get in a comfortable lying or sitting position and time how long it takes to feel 10 kicks, twists, turns, swishes, or rolls. Call your physician or midwife if there have not been 10 kicks in 1 hours    Call physician or midwife, return to Labor and Delivery, call 911, or go to the nearest Emergency Room if: increased leakage or fluid, contractions more than  6 per  1 hour, decreased fetal movement, persistent low back pain or cramping, bleeding from vaginal area, difficulty urinating, pain with urination, difficulty breathing, new calf pain, persistent headache or  vision change

## 2022-02-04 NOTE — NURSING
Pt arrived to unit w/ complaints of epigastric burning since yesterday and diarrhea. Pt has been taking immodium and tums w/ no relief. Pt unreferred from [facility], seeing Dr. Fontaine. Report given to Dr. Lipscomb, orders to give pt pepcid, 40mg, PO, now. NST, check cervix, and administer benadryl before d/c home.

## 2022-02-04 NOTE — NURSING
NST reactive. SVE closed/thick/high.Pt advised to follow up w/ MD tomorrow morning. Pepcid helped and pt feels she can sleep. Pt refused benadryl. Pt d/c from monitors and asked to await d/c instructions.

## 2022-05-27 ENCOUNTER — HOSPITAL ENCOUNTER (EMERGENCY)
Facility: HOSPITAL | Age: 27
Discharge: HOME | End: 2022-05-27
Attending: EMERGENCY MEDICINE
Payer: MEDICAID

## 2022-05-27 VITALS
OXYGEN SATURATION: 100 % | BODY MASS INDEX: 32.39 KG/M2 | WEIGHT: 165 LBS | TEMPERATURE: 98 F | DIASTOLIC BLOOD PRESSURE: 73 MMHG | RESPIRATION RATE: 18 BRPM | HEIGHT: 60 IN | HEART RATE: 62 BPM | SYSTOLIC BLOOD PRESSURE: 134 MMHG

## 2022-05-27 DIAGNOSIS — J02.9 PHARYNGITIS, UNSPECIFIED ETIOLOGY: Primary | ICD-10-CM

## 2022-05-27 LAB
B-HCG UR QL: NEGATIVE
CTP QC/QA: YES
CTP QC/QA: YES
MOLECULAR STREP A: NEGATIVE

## 2022-05-27 PROCEDURE — 81025 URINE PREGNANCY TEST: CPT | Performed by: EMERGENCY MEDICINE

## 2022-05-27 PROCEDURE — 63600175 PHARM REV CODE 636 W HCPCS: Performed by: PHYSICIAN ASSISTANT

## 2022-05-27 PROCEDURE — 96372 THER/PROPH/DIAG INJ SC/IM: CPT | Performed by: PHYSICIAN ASSISTANT

## 2022-05-27 PROCEDURE — 99284 EMERGENCY DEPT VISIT MOD MDM: CPT | Mod: 25

## 2022-05-27 PROCEDURE — 87880 STREP A ASSAY W/OPTIC: CPT

## 2022-05-27 RX ORDER — PREDNISONE 20 MG/1
40 TABLET ORAL DAILY
Qty: 6 TABLET | Refills: 0 | Status: SHIPPED | OUTPATIENT
Start: 2022-05-27 | End: 2022-05-30

## 2022-05-27 RX ORDER — MELOXICAM 7.5 MG/1
7.5 TABLET ORAL DAILY
Qty: 12 TABLET | Refills: 0 | Status: SHIPPED | OUTPATIENT
Start: 2022-05-27

## 2022-05-27 RX ORDER — DEXAMETHASONE SODIUM PHOSPHATE 4 MG/ML
12 INJECTION, SOLUTION INTRA-ARTICULAR; INTRALESIONAL; INTRAMUSCULAR; INTRAVENOUS; SOFT TISSUE
Status: COMPLETED | OUTPATIENT
Start: 2022-05-27 | End: 2022-05-27

## 2022-05-27 RX ORDER — KETOROLAC TROMETHAMINE 30 MG/ML
30 INJECTION, SOLUTION INTRAMUSCULAR; INTRAVENOUS
Status: COMPLETED | OUTPATIENT
Start: 2022-05-27 | End: 2022-05-27

## 2022-05-27 RX ADMIN — DEXAMETHASONE SODIUM PHOSPHATE 12 MG: 4 INJECTION INTRA-ARTICULAR; INTRALESIONAL; INTRAMUSCULAR; INTRAVENOUS; SOFT TISSUE at 06:05

## 2022-05-27 RX ADMIN — KETOROLAC TROMETHAMINE 30 MG: 30 INJECTION, SOLUTION INTRAMUSCULAR at 06:05

## 2022-05-27 NOTE — DISCHARGE INSTRUCTIONS

## 2022-05-27 NOTE — ED PROVIDER NOTES
Encounter Date: 5/27/2022    SCRIBE #1 NOTE: I, Aric Murillo, am scribing for, and in the presence of,  Jay Goldstein PA-C. I have scribed the following portions of the note - Other sections scribed: HPI, ROS.       History     Chief Complaint   Patient presents with    Sore Throat     Pt c/c sore throat starting this morning and getting worse. Pt also has slight congestion.     27 y.o. female, with a pertinent past medical history of asthma and bronchitis presents to the ED with a sore throat that began today. Pt states that the pain worsens when trying to speak. Pt reports mild associated nasal congestion. Pt denies a history of acid reflux. Pt denies any recent oral sex. Pt denies taking any medication prior to arrival. No other exacerbating or alleviating factors. Patient denies cough, fever, voice change, or other associated symptoms.       The history is provided by the patient. No  was used.     Review of patient's allergies indicates:  No Known Allergies  Past Medical History:   Diagnosis Date    Asthma     Bronchitis      History reviewed. No pertinent surgical history.  Family History   Problem Relation Age of Onset    Breast cancer Paternal Grandmother     Depression Neg Hx     Drug abuse Neg Hx     Early death Neg Hx     Hearing loss Neg Hx     Colon cancer Neg Hx     Ovarian cancer Neg Hx      Social History     Tobacco Use    Smoking status: Never Smoker    Smokeless tobacco: Never Used   Substance Use Topics    Alcohol use: No    Drug use: No     Review of Systems   Constitutional: Negative for chills and fever.   HENT: Positive for congestion and sore throat. Negative for rhinorrhea and voice change.    Eyes: Negative for visual disturbance.   Respiratory: Negative for cough and shortness of breath.    Cardiovascular: Negative for chest pain.   Gastrointestinal: Negative for abdominal pain, diarrhea, nausea and vomiting.   Genitourinary: Negative for dysuria,  frequency and hematuria.   Musculoskeletal: Negative for back pain.   Skin: Negative for rash.   Neurological: Negative for dizziness, weakness and headaches.       Physical Exam     Initial Vitals [05/27/22 1716]   BP Pulse Resp Temp SpO2   134/73 62 18 98.2 °F (36.8 °C) 100 %      MAP       --         Physical Exam    Nursing note and vitals reviewed.  Constitutional: She appears well-developed and well-nourished. She is not diaphoretic. No distress.   HENT:   Head: Atraumatic.   Right Ear: External ear normal.   Left Ear: External ear normal.   Mild erythema to the posterior oropharynx with slight swelling to the right tonsil.  No exudates or petechiae.  Uvula midline.  Phonation is slightly muffled, however, patient states this is her baseline.  No hot potato voice.  Nasal congestion present.   Eyes: Conjunctivae and EOM are normal.   Neck: No tracheal deviation present.   Normal range of motion.  Cardiovascular: Normal rate and regular rhythm.   Pulmonary/Chest: No accessory muscle usage or stridor. No tachypnea. No respiratory distress.   Musculoskeletal:         General: No edema. Normal range of motion.      Cervical back: Normal range of motion.     Neurological: She is alert and oriented to person, place, and time. She displays no tremor. She displays no seizure activity. Coordination and gait normal.   Skin: Skin is intact. Capillary refill takes less than 2 seconds. No rash noted. No erythema.         ED Course   Procedures  Labs Reviewed   POCT URINE PREGNANCY   POCT STREP A MOLECULAR          Imaging Results    None          Medications   dexamethasone injection 12 mg (12 mg Intramuscular Given 5/27/22 1813)   ketorolac injection 30 mg (30 mg Intramuscular Given 5/27/22 1813)     Medical Decision Making:   History:   Old Medical Records: I decided to obtain old medical records.  ED Management:  Rapid strep negative.  No peritonsillar abscess or signs of deep space infection.  Tolerating p.o..  In the  absence of other findings, symptoms likely viral.  Denies history of GERD and cancer.  Denies history of oral sex.  Will offer supportive care.  Advising follow-up with PCP. Strict return precautions discussed.  Agreeable to plan.          Scribe Attestation:   Scribe #1: I performed the above scribed service and the documentation accurately describes the services I performed. I attest to the accuracy of the note.                 Clinical Impression:   Final diagnoses:  [J02.9] Pharyngitis, unspecified etiology (Primary)          ED Disposition Condition    Discharge Stable        ED Prescriptions     Medication Sig Dispense Start Date End Date Auth. Provider    benzocaine-menthoL 15-20 mg Lozg Follows directions on packaging 16 lozenge 5/27/2022  Jay Goldstein PA-C    meloxicam (MOBIC) 7.5 MG tablet Take 1 tablet (7.5 mg total) by mouth once daily. 12 tablet 5/27/2022  Jay Goldstein PA-C    predniSONE (DELTASONE) 20 MG tablet Take 2 tablets (40 mg total) by mouth once daily. for 3 days 6 tablet 5/27/2022 5/30/2022 Jay Goldstein PA-C        Follow-up Information     Follow up With Specialties Details Why Contact Info    Andrea Vaughn MD Neonatology Schedule an appointment as soon as possible for a visit in 3 days For re-evaluation [address]  [place] [address]  [phone]      [facility] Emergency Dept Emergency Medicine Go to  If symptoms worsen [address] [address] [phone]        I, Jay Goldstein PA-C, personally performed the services described in this documentation. All medical record entries made by the scribe were at my direction and in my presence. I have reviewed the chart and agree that the record reflects my personal performance and is accurate and complete.       Jay Goldstein PA-C  05/27/22 [id number]

## 2022-05-27 NOTE — Clinical Note
"Jenna Angelamandeep Lozada was seen and treated in our emergency department on 5/27/2022.  She may return to work on 05/30/2022.       If you have any questions or concerns, please don't hesitate to call.      Jay Goldstein PA-C"

## 2022-09-22 ENCOUNTER — HOSPITAL ENCOUNTER (EMERGENCY)
Facility: HOSPITAL | Age: 27
Discharge: HOME | End: 2022-09-22
Attending: EMERGENCY MEDICINE
Payer: MEDICAID

## 2022-09-22 VITALS
OXYGEN SATURATION: 99 % | WEIGHT: 175 LBS | HEIGHT: 59 IN | DIASTOLIC BLOOD PRESSURE: 67 MMHG | HEART RATE: 65 BPM | SYSTOLIC BLOOD PRESSURE: 124 MMHG | BODY MASS INDEX: 35.28 KG/M2 | TEMPERATURE: 99 F | RESPIRATION RATE: 20 BRPM

## 2022-09-22 DIAGNOSIS — G43.809 OTHER MIGRAINE WITHOUT STATUS MIGRAINOSUS, NOT INTRACTABLE: Primary | ICD-10-CM

## 2022-09-22 DIAGNOSIS — Z33.1 PREGNANT STATE, INCIDENTAL: ICD-10-CM

## 2022-09-22 PROBLEM — J45.909 ASTHMA: Status: ACTIVE | Noted: 2022-09-22

## 2022-09-22 PROBLEM — O10.919 HTN IN PREGNANCY, CHRONIC: Status: ACTIVE | Noted: 2021-09-10

## 2022-09-22 PROBLEM — N87.0 DYSPLASIA OF CERVIX, LOW GRADE (CIN 1): Status: ACTIVE | Noted: 2021-07-31

## 2022-09-22 LAB
B-HCG UR QL: POSITIVE
CTP QC/QA: YES

## 2022-09-22 PROCEDURE — 25000003 PHARM REV CODE 250: Performed by: PHYSICIAN ASSISTANT

## 2022-09-22 PROCEDURE — 81025 URINE PREGNANCY TEST: CPT | Performed by: EMERGENCY MEDICINE

## 2022-09-22 PROCEDURE — 99284 EMERGENCY DEPT VISIT MOD MDM: CPT | Mod: 25

## 2022-09-22 RX ORDER — PROMETHAZINE HYDROCHLORIDE 25 MG/1
25 TABLET ORAL EVERY 6 HOURS PRN
Qty: 15 TABLET | Refills: 0 | Status: SHIPPED | OUTPATIENT
Start: 2022-09-22

## 2022-09-22 RX ORDER — DIPHENHYDRAMINE HCL 25 MG
25 CAPSULE ORAL EVERY 6 HOURS PRN
Qty: 20 CAPSULE | Refills: 0 | Status: SHIPPED | OUTPATIENT
Start: 2022-09-22

## 2022-09-22 RX ORDER — ACETAMINOPHEN 325 MG/1
650 TABLET ORAL EVERY 6 HOURS PRN
Qty: 20 TABLET | Refills: 0 | Status: SHIPPED | OUTPATIENT
Start: 2022-09-22 | End: 2022-09-25

## 2022-09-22 RX ORDER — ACETAMINOPHEN 500 MG
1000 TABLET ORAL
Status: COMPLETED | OUTPATIENT
Start: 2022-09-22 | End: 2022-09-22

## 2022-09-22 RX ADMIN — ACETAMINOPHEN 1000 MG: 500 TABLET ORAL at 10:09

## 2022-09-22 NOTE — ED PROVIDER NOTES
"Encounter Date: 9/22/2022    SCRIBE #1 NOTE: I, Kanikamarvin Bran, am scribing for, and in the presence of,  Jay Goldstein PA-C. I have scribed the following portions of the note - Other sections scribed: HPI, ROS.     History     Chief Complaint   Patient presents with    Headache     "Water headache" since yesterday with body "feeling warm."      This 27 y.o female, with a medical history of Asthma and Bronchitis, presents to the ED c/o an acute, constant, severe (10/10) frontal headache that began upon awaking 2 days ago. Pt reports associated photophobia and a subjective fever. No recent trauma. No history of migraine headaches. LMP is end of August 2022. She denies cough, rhinorrhea, chest pain, shortness of breath, abdominal pain, vaginal bleeding, or dysuria. No other associated symptoms. No treatment attempted PTA to the ED. No alleviating factors.    The history is provided by the patient.   Review of patient's allergies indicates:  No Known Allergies  Past Medical History:   Diagnosis Date    Asthma     Bronchitis      History reviewed. No pertinent surgical history.  Family History   Problem Relation Age of Onset    Breast cancer Paternal Grandmother     Depression Neg Hx     Drug abuse Neg Hx     Early death Neg Hx     Hearing loss Neg Hx     Colon cancer Neg Hx     Ovarian cancer Neg Hx      Social History     Tobacco Use    Smoking status: Never    Smokeless tobacco: Never   Substance Use Topics    Alcohol use: No    Drug use: No     Review of Systems   Constitutional:  Positive for fever (subjective).   HENT:  Negative for sore throat.    Eyes:  Positive for photophobia.   Respiratory:  Negative for shortness of breath.    Cardiovascular:  Negative for chest pain.   Gastrointestinal:  Negative for nausea.   Genitourinary:  Negative for dysuria.   Musculoskeletal:  Negative for back pain.   Skin:  Negative for rash.   Neurological:  Positive for headaches (frontal). Negative for weakness.     Physical " Exam     Initial Vitals [09/22/22 0915]   BP Pulse Resp Temp SpO2   129/63 86 20 99 °F (37.2 °C) 100 %      MAP       --         Physical Exam    Nursing note and vitals reviewed.  Constitutional: She appears well-developed and well-nourished. She is not diaphoretic. No distress.   HENT:   Head: Atraumatic.   Right Ear: External ear normal.   Left Ear: External ear normal.   Mouth/Throat: Oropharynx is clear and moist.   Eyes: Conjunctivae and EOM are normal.   Neck: No tracheal deviation present.   Normal range of motion.  Cardiovascular:  Normal rate and regular rhythm.           Pulmonary/Chest: No accessory muscle usage or stridor. No tachypnea. No respiratory distress.   Musculoskeletal:         General: No edema. Normal range of motion.      Cervical back: Normal range of motion.     Neurological: She is alert and oriented to person, place, and time. She displays no tremor. She displays no seizure activity. Coordination and gait normal.   Skin: Skin is intact. Capillary refill takes less than 2 seconds. No rash noted. No erythema.       ED Course   Procedures  Labs Reviewed   POCT URINE PREGNANCY - Abnormal; Notable for the following components:       Result Value    POC Preg Test, Ur Positive (*)     All other components within normal limits          Imaging Results    None          Medications   acetaminophen tablet 1,000 mg (1,000 mg Oral Given 9/22/22 1031)     Medical Decision Making:   History:   Old Medical Records: I decided to obtain old medical records.  ED Management:  Presentation suspicious for migraine in the setting of incidental pregnancy.  Presumed to be early gestation approximately 4 weeks based on LMP.  Too early for preeclampsia.  Blood pressure normal.  No deficits.  No meningismus.  Advising follow-up with OBGYN. Strict return precautions discussed.  Agreeable to plan.        Scribe Attestation:   Scribe #1: I performed the above scribed service and the documentation accurately describes  the services I performed. I attest to the accuracy of the note.                   Clinical Impression:   Final diagnoses:  [G43.809] Other migraine without status migrainosus, not intractable (Primary)  [Z33.1] Pregnant state, incidental        ED Disposition Condition    Discharge Stable          ED Prescriptions       Medication Sig Dispense Start Date End Date Auth. Provider    promethazine (PHENERGAN) 25 MG tablet Take 1 tablet (25 mg total) by mouth every 6 (six) hours as needed for Nausea. 15 tablet 9/22/2022 -- Jay Goldstein PA-C    diphenhydrAMINE (BENADRYL) 25 mg capsule Take 1 capsule (25 mg total) by mouth every 6 (six) hours as needed for Itching or Allergies. 20 capsule 9/22/2022 -- Jay Goldstein PA-C    acetaminophen (TYLENOL) 325 MG tablet Take 2 tablets (650 mg total) by mouth every 6 (six) hours as needed for Pain. 20 tablet 9/22/2022 9/25/2022 Jay Goldstein PA-C          Follow-up Information       Follow up With Specialties Details Why Contact Info    Benjamin Perkins MD Obstetrics and Gynecology Schedule an appointment as soon as possible for a visit in 1 day For further evaluation, For more definitive management of your symptoms [address]  [address]  [facility] [address]  [phone]      [facility] Emergency Medicine Go to  If symptoms worsen [address]  [facility] [address] [phone]          I, Jay Goldstein PA-C, personally performed the services described in this documentation. All medical record entries made by the scribe were at my direction and in my presence. I have reviewed the chart and agree that the record reflects my personal performance and is accurate and complete.      Jay Goldstein PA-C  09/22/22 1921

## 2022-09-22 NOTE — DISCHARGE INSTRUCTIONS

## 2022-10-07 ENCOUNTER — HOSPITAL ENCOUNTER (EMERGENCY)
Facility: HOSPITAL | Age: 27
Discharge: HOME | End: 2022-10-07
Attending: INTERNAL MEDICINE
Payer: MEDICAID

## 2022-10-07 VITALS
HEART RATE: 88 BPM | TEMPERATURE: 99 F | WEIGHT: 173 LBS | OXYGEN SATURATION: 99 % | BODY MASS INDEX: 34.94 KG/M2 | DIASTOLIC BLOOD PRESSURE: 53 MMHG | RESPIRATION RATE: 18 BRPM | SYSTOLIC BLOOD PRESSURE: 117 MMHG

## 2022-10-07 DIAGNOSIS — H00.015 HORDEOLUM EXTERNUM OF LEFT LOWER EYELID: Primary | ICD-10-CM

## 2022-10-07 PROCEDURE — 25000003 PHARM REV CODE 250: Performed by: PHYSICIAN ASSISTANT

## 2022-10-07 PROCEDURE — 99283 EMERGENCY DEPT VISIT LOW MDM: CPT

## 2022-10-07 RX ORDER — ERYTHROMYCIN 5 MG/G
OINTMENT OPHTHALMIC
Status: COMPLETED | OUTPATIENT
Start: 2022-10-07 | End: 2022-10-07

## 2022-10-07 RX ORDER — ERYTHROMYCIN 5 MG/G
OINTMENT OPHTHALMIC EVERY 4 HOURS
Qty: 1 G | Refills: 0 | Status: SHIPPED | OUTPATIENT
Start: 2022-10-07 | End: 2022-10-14

## 2022-10-07 RX ORDER — TETRACAINE HYDROCHLORIDE 5 MG/ML
2 SOLUTION OPHTHALMIC
Status: DISCONTINUED | OUTPATIENT
Start: 2022-10-07 | End: 2022-10-07

## 2022-10-07 RX ADMIN — ERYTHROMYCIN 1 INCH: 5 OINTMENT OPHTHALMIC at 03:10

## 2022-10-07 NOTE — ED PROVIDER NOTES
Encounter Date: 10/7/2022       History     Chief Complaint   Patient presents with    Facial Swelling     L eye swelling x 3 days      Chief Complaint: eyelid swelling  History of  Present Illness: History obtained from patient. This 27 y.o. female who has past medical history of asthma presents to the ED complaining of left lower eyelid swelling for 3 days.  Patient does not were contact lenses or glasses.  Denies vision changes, eye drainage, fever, cough, congestion, rhinorrhea.  Denies pain to the eye.      Review of patient's allergies indicates:  No Known Allergies  Past Medical History:   Diagnosis Date    Asthma     Bronchitis      No past surgical history on file.  Family History   Problem Relation Age of Onset    Breast cancer Paternal Grandmother     Depression Neg Hx     Drug abuse Neg Hx     Early death Neg Hx     Hearing loss Neg Hx     Colon cancer Neg Hx     Ovarian cancer Neg Hx      Social History     Tobacco Use    Smoking status: Never    Smokeless tobacco: Never   Substance Use Topics    Alcohol use: No    Drug use: No     Review of Systems   Constitutional:  Negative for fever.   HENT:  Negative for congestion and rhinorrhea.    Eyes:  Negative for pain, discharge, redness, itching and visual disturbance.   Respiratory:  Negative for cough.      Physical Exam     Initial Vitals [10/07/22 0248]   BP Pulse Resp Temp SpO2   (!) 117/53 88 18 99.4 °F (37.4 °C) 99 %      MAP       --         Physical Exam    Nursing note and vitals reviewed.  Constitutional: She appears well-developed and well-nourished. She is active.  Non-toxic appearance. She does not have a sickly appearance. She does not appear ill.   HENT:   Head: Normocephalic and atraumatic.   Nose: Nose normal.   Mouth/Throat: Uvula is midline, oropharynx is clear and moist and mucous membranes are normal.   Eyes: Conjunctivae, EOM and lids are normal. Pupils are equal, round, and reactive to light. Left eye exhibits hordeolum (Left lower  eyelid).   Neck: Neck supple.   Normal range of motion.   Full passive range of motion without pain.     Cardiovascular:  Normal rate and regular rhythm.           Musculoskeletal:      Cervical back: Full passive range of motion without pain, normal range of motion and neck supple.     Neurological: She is alert and oriented to person, place, and time.   Skin: Skin is warm. Capillary refill takes less than 2 seconds.       ED Course   Procedures  Labs Reviewed   POCT URINE PREGNANCY          Imaging Results    None          Medications   fluorescein ophthalmic strip 1 each (has no administration in time range)   TETRAcaine HCl (PF) 0.5 % Drop 2 drop (has no administration in time range)   erythromycin 5 mg/gram (0.5 %) ophthalmic ointment (has no administration in time range)     Medical Decision Making:   ED Management:  This is an evaluation of a 27 y.o. female that presents to the Emergency Department for eyelid swelling. Physical Exam shows a non-toxic, afebrile, and well appearing female. PERRL. EOM's intact and without pain.  There is no proptosis, scleral icterus , erythema or increased warmth in periorbital area. There is no conjunctival injection. No findings of open globe injury. There is no dendritic lesions, facial rash and a negative Atkins's sign.  There is a hordeolum present to the left lower eyelid.    Vital Signs Are Reassuring. If available, previous records reviewed.    My overall impression is hordeolum. I considered, but at this time, do not suspect conjunctivitis, iritis, acute angle closure glaucoma, orbital or preseptal cellulitis, herpetic involvement, open globe injury.     Encourage warm compresses at home.  Will discharge patient home with erythromycin.  The diagnosis, treatment plan, instructions for follow-up and reevaluation with opthalmology as well as ED return precautions were discussed and understanding was verbalized. All questions or concerns have been addressed.                           Clinical Impression:   Final diagnoses:  [H00.015] Hordeolum externum of left lower eyelid (Primary)        ED Disposition Condition    Discharge Stable          ED Prescriptions       Medication Sig Dispense Start Date End Date Auth. Provider    erythromycin (ROMYCIN) ophthalmic ointment Place into the left eye every 4 (four) hours. Place a 1/2 inch ribbon of ointment into the lower eyelid. for 7 days 1 g 10/7/2022 10/14/2022 Donavan Perkins PA-C          Follow-up Information       Follow up With Specialties Details Why Contact Info    Andrea Vaughn MD Neonatology   [address] [facility] [address] [place] [address]  [phone]      [facility] Emergency Dept Emergency Medicine Go in 1 day If symptoms worsen [address] [facility] [address] [phone]             Donavan Perkins PA-C  10/07/22 0301

## 2022-10-07 NOTE — ED TRIAGE NOTES
Jenna Lozada, a 27 y.o. female presents to the ED w/ complaint of left eye swelling x 3 days. Denies any other symptoms.    Triage note:  Chief Complaint   Patient presents with    Facial Swelling     L eye swelling x 3 days      Review of patient's allergies indicates:  No Known Allergies  Past Medical History:   Diagnosis Date    Asthma     Bronchitis

## 2022-10-10 ENCOUNTER — PATIENT OUTREACH (OUTPATIENT)
Dept: EMERGENCY MEDICINE | Facility: HOSPITAL | Age: 27
End: 2022-10-10
Payer: MEDICAID

## 2022-10-10 NOTE — PROGRESS NOTES
Sunday Hodges  ED Navigator  Emergency Department    Project: [facility] ED Navigator  Role: Community Health Worker    Date: 10/10/2022  Patient Name: Jenna Lozada  MRN: [id number]  PCP: Andrea Vaughn MD    Assessment:     Jenna Lozada is a 27 y.o. female who has presented to ED for facial swelling. Patient has visited the ED 2 times in the past 3 months. Patient did not contact PCP.     ED Navigator Initial Assessment    ED Navigator Enrollment Documentation  Consent to Services  Does patient consent to completing the assessment?: Yes  Contact  Method of Initial Contact: Phone  Transportation  Does the patient have issues with Transportation?: No  Does the patient have transportation to and from healthcare appointments?: Yes  Insurance Coverage  Do you have coverage/adequate coverage?: Yes  Type/kind of coverage: Primary Cvg:  [place]icaid/[place] [employer] [facility]  Is patient able to afford co-pays/deductibles?: Yes  Is patient able to afford HME or supplies?: Yes  Does patient have an established [facility] PCP?: No  Does patient need assistance finding a PCP?: Yes  Does the patient have a lack of adequate coverage?: No  Specialist Appointment  Did the patient come to the ED to see a specialist?: No  Does the patient have a pending specialist referral?: No  Does the patient have a specialist appointment made?: No  PCP Follow Up Appointment  Has the patient had an appointment with a primary care provider in the past year?: No  Does the patient have a follow up appontment with a PCP?: No  When was the last time you saw your PCP?: 10/10/15  Why does the patient not have a follow up scheduled?: No established [facility]/outside PCP  Would you like an [facility] PCP?: Yes  Medications  Is patient able to afford medication?: Yes  Is patient unable to get medication due to lack of transportation?: No  Psychological  Does the patient have psycho-social concerns?: No  Food  Does the patient have concerns about food?:  No  Communication/Education  Does the patient have limited English proficiency/English not primary language?: No  Does patient have low literacy and/or low health literacy?: Yes  Does patient have concerns with care?: No  Does patient have dissatisfaction with care?: No  Other Financial Concerns  Does the patient have immediate financial distress?: No  Does the patient have general financial concerns?: No  Other Social Barriers/Concerns  Does the patient have any additional barriers or concerns?: Unable to afford utilities  Primary Barrier  Barriers identified: Cognitive barrier (health literacy, language and communication, etc.)  Root Cause of ED Utilization: Patient Knowledge/Low Health Literacy  Plan to address Patient Knowledge/Low Health Literacy: Provided information for [facility] 24/7 Nurse triage line [phone] or [facility] ([phone])  Next steps: Provided Education  Was education/educational materials provided surrounding PCP services/creating a medical home?: Yes Was education verbal or written?: Verbal, Written (Comment: PCP list sent vai e-mail)     Was education/educational materials provided surrounding low cost, healthy foods?: Yes Was education verbal or written?: Written (Comment: Healthy Eating information sent vai e-mail)     Was education/educational materials provided surrounding other items? If so, use comment to explain.: Yes Was education verbal or written?: Written (Comment: Ped. bahavior therapist list sent via e-mail)   Plan: Provided information for [facility] 24/7 Nurse triage line, [phone] or [facility] ([phone])  Expected Date of Follow Up 1: 12/19/22  Additional Documentation: ED Navigator spoke with patient regarding her recent ER visit. Patient is doing well. Patient declines having scheduled a follow-up visit with her PCP. Assessment completed. Patient requested resources for PCP, Pediatric behavior therapist, rental assistance, and utility  assistance. In addition to the requested resources,  Right [facility] Right Place form, [place] Virtual Visit Flyer, [facility] PCP scheduling assistance, [facility] on call RN#, and Heart Healthy Diet education were sent via verified e-mail.  Sunday Hodges          Social History     Socioeconomic History    Marital status: Single   Tobacco Use    Smoking status: Never    Smokeless tobacco: Never   Substance and Sexual Activity    Alcohol use: No    Drug use: No    Sexual activity: Yes     Partners: Male   Social History Narrative    [employer], 11th grade     Social Determinants of Health     Financial Resource Strain: Medium Risk    Difficulty of Paying Living Expenses: Somewhat hard   Food Insecurity: No Food Insecurity    Worried About Running Out of Food in the Last Year: Never true    Ran Out of Food in the Last Year: Never true   Transportation Needs: No Transportation Needs    Lack of Transportation (Medical): No    Lack of Transportation (Non-Medical): No   Physical Activity: Sufficiently Active    Days of Exercise per Week: 5 days    Minutes of Exercise per Session: 30 min   Stress: No Stress Concern Present    Feeling of Stress : Not at all   Social Connections: Socially Isolated    Frequency of Communication with Friends and Family: Never    Frequency of Social Gatherings with Friends and Family: Never    Attends Baptism Services: Never    Active Member of Clubs or Organizations: No    Attends Club or Organization Meetings: Never    Marital Status: Never    Housing Stability: Low Risk     Unable to Pay for Housing in the Last Year: No    Number of Places Lived in the Last Year: 1    Unstable Housing in the Last Year: No       Plan:   ED Navigator spoke with patient regarding her recent ER visit. Patient is doing well. Patient declines having scheduled a follow-up visit with her PCP. Assessment completed. Patient requested resources for PCP, Pediatric behavior therapist, rental assistance, and utility assistance. In  addition to the requested resources,  Right Care Right Place form, [place] Virtual Visit Flyer, [place] PCP scheduling assistance, [facility] on call RN#, and Heart Healthy Diet education were sent via verified e-mail.  Sunday Hodges       Appointment made with: Andrea Vaughn MD

## 2022-12-19 ENCOUNTER — PATIENT OUTREACH (OUTPATIENT)
Dept: EMERGENCY MEDICINE | Facility: HOSPITAL | Age: 27
End: 2022-12-19
Payer: MEDICAID

## 2023-06-20 ENCOUNTER — PATIENT MESSAGE (OUTPATIENT)
Dept: RESEARCH | Facility: HOSPITAL | Age: 28
End: 2023-06-20
Payer: MEDICAID

## 2024-01-10 ENCOUNTER — PATIENT OUTREACH (OUTPATIENT)
Dept: EMERGENCY MEDICINE | Facility: HOSPITAL | Age: 29
End: 2024-01-10
Payer: MEDICAID

## 2024-10-11 ENCOUNTER — HOSPITAL ENCOUNTER (EMERGENCY)
Facility: HOSPITAL | Age: 29
Discharge: HOME | End: 2024-10-11
Attending: EMERGENCY MEDICINE
Payer: MEDICAID

## 2024-10-11 VITALS
BODY MASS INDEX: 29.35 KG/M2 | HEIGHT: 67 IN | TEMPERATURE: 99 F | RESPIRATION RATE: 20 BRPM | HEART RATE: 76 BPM | OXYGEN SATURATION: 97 % | WEIGHT: 187 LBS | DIASTOLIC BLOOD PRESSURE: 59 MMHG | SYSTOLIC BLOOD PRESSURE: 126 MMHG

## 2024-10-11 DIAGNOSIS — J02.9 VIRAL PHARYNGITIS: Primary | ICD-10-CM

## 2024-10-11 LAB
B-HCG UR QL: NEGATIVE
CTP QC/QA: YES
MOLECULAR STREP A: NEGATIVE
POC MOLECULAR INFLUENZA A AGN: NEGATIVE
POC MOLECULAR INFLUENZA B AGN: NEGATIVE
SARS-COV-2 RDRP RESP QL NAA+PROBE: NEGATIVE

## 2024-10-11 PROCEDURE — 87502 INFLUENZA DNA AMP PROBE: CPT

## 2024-10-11 PROCEDURE — 99283 EMERGENCY DEPT VISIT LOW MDM: CPT

## 2024-10-11 PROCEDURE — 81025 URINE PREGNANCY TEST: CPT

## 2024-10-11 PROCEDURE — 87651 STREP A DNA AMP PROBE: CPT

## 2024-10-11 PROCEDURE — 87635 SARS-COV-2 COVID-19 AMP PRB: CPT

## 2024-10-11 RX ORDER — IBUPROFEN 600 MG/1
600 TABLET ORAL EVERY 6 HOURS PRN
Qty: 20 TABLET | Refills: 0 | Status: SHIPPED | OUTPATIENT
Start: 2024-10-11

## 2024-10-11 RX ORDER — GUAIFENESIN AND DEXTROMETHORPHAN HYDROBROMIDE 10; 100 MG/5ML; MG/5ML
5 SYRUP ORAL EVERY 6 HOURS PRN
Qty: 473 ML | Refills: 0 | Status: SHIPPED | OUTPATIENT
Start: 2024-10-11 | End: 2024-10-21

## 2024-10-11 RX ORDER — ACETAMINOPHEN 500 MG
1000 TABLET ORAL EVERY 6 HOURS PRN
Qty: 56 TABLET | Refills: 0 | Status: SHIPPED | OUTPATIENT
Start: 2024-10-11 | End: 2024-10-18

## 2024-10-11 RX ORDER — BENZONATATE 200 MG/1
200 CAPSULE ORAL 3 TIMES DAILY PRN
Qty: 30 CAPSULE | Refills: 0 | Status: SHIPPED | OUTPATIENT
Start: 2024-10-11 | End: 2024-10-21

## 2024-10-11 RX ORDER — CETIRIZINE HYDROCHLORIDE 10 MG/1
10 TABLET ORAL DAILY
Qty: 30 TABLET | Refills: 0 | Status: SHIPPED | OUTPATIENT
Start: 2024-10-11 | End: 2025-10-11

## 2024-10-11 RX ORDER — FLUTICASONE PROPIONATE 50 MCG
2 SPRAY, SUSPENSION (ML) NASAL DAILY
Qty: 15.8 ML | Refills: 0 | Status: SHIPPED | OUTPATIENT
Start: 2024-10-11 | End: 2024-11-10

## 2024-10-11 NOTE — Clinical Note
"Jenna Angelamandeep Lozada was seen and treated in our emergency department on 10/11/2024.  She may return to school on 10/14/2024.      If you have any questions or concerns, please don't hesitate to call.      Yann Garcia, YORDAN"

## 2024-10-11 NOTE — ED PROVIDER NOTES
"Encounter Date: 10/11/2024    SCRIBE #1 NOTE: I, Kanika Bran, am scribing for, and in the presence of,  Yann Garcia PA-C. I have scribed the following portions of the note - Other sections scribed: HPI, ROS.       History     Chief Complaint   Patient presents with    Sore Throat     Pt to ER with reports of sore throat, cough, congestion x few days      This 29 y.o female with a medical history of Asthma and Bronchitis presents to the ED accompanied by her fiance c/o an acute, constant, severe (8/10) sore throat for the last few days. Pt reports that she initially began to experience a headache and body aches at onset followed by sore throat and voice change. She states that the body aches have since resolved, however, she continues to experience a headache, sore throat and raspy voice accompanied by fatigue, slight cough, and sneezing. Pt notes use of [facility] for treatment. She reports that she became concerned this morning as she began to feel as though her throat was closing and she "gasping for air." Pt denies shortness of breath, chest pain or any other associated symptoms.     The history is provided by the patient. No  was used.     Review of patient's allergies indicates:  No Known Allergies  Past Medical History:   Diagnosis Date    Asthma     Bronchitis      History reviewed. No pertinent surgical history.  Family History   Problem Relation Name Age of Onset    Breast cancer Paternal Grandmother      Depression Neg Hx      Drug abuse Neg Hx      Early death Neg Hx      Hearing loss Neg Hx      Colon cancer Neg Hx      Ovarian cancer Neg Hx       Social History     Tobacco Use    Smoking status: Never    Smokeless tobacco: Never   Substance Use Topics    Alcohol use: No    Drug use: No     Review of Systems   Constitutional:  Positive for fatigue. Negative for diaphoresis and unexpected weight change.   HENT:  Positive for sneezing, sore throat and voice change (raspy). Negative " for sinus pain.         (+) throat closing sensation   Eyes:  Negative for pain, redness and visual disturbance.   Respiratory:  Positive for cough (slight). Negative for chest tightness, shortness of breath and wheezing.    Cardiovascular:  Negative for chest pain and palpitations.   Gastrointestinal:  Negative for abdominal pain, blood in stool, diarrhea, nausea and vomiting.   Endocrine: Negative for polydipsia, polyphagia and polyuria.   Genitourinary:  Negative for dysuria, frequency and urgency.   Musculoskeletal:  Negative for arthralgias, back pain and myalgias.   Skin:  Negative for rash.   Allergic/Immunologic: Negative for environmental allergies.   Neurological:  Positive for headaches. Negative for dizziness and syncope.   Psychiatric/Behavioral:  Negative for suicidal ideas.        Physical Exam     Initial Vitals [10/11/24 0809]   BP Pulse Resp Temp SpO2   (!) 126/59 76 20 98.7 °F (37.1 °C) 97 %      MAP       --         Physical Exam    Nursing note and vitals reviewed.  Constitutional: Vital signs are normal. She appears well-developed and well-nourished. She is cooperative. She does not appear ill. No distress.   Well-appearing.  No acute distress.   HENT:   Head: Normocephalic and atraumatic.   Right Ear: Hearing and external ear normal.   Left Ear: Hearing and external ear normal.   Nose: Nose normal.   Nasal congestion noted.  Tonsils 2+, no erythema or exudates.  Patient was able to swallow and is managing secretions appropriately.  Tympanic membranes intact, no middle ear effusion.   Eyes: Conjunctivae and EOM are normal.   Neck: Phonation normal.   Normal range of motion.  Cardiovascular:  Normal rate and regular rhythm.           No murmur heard.  Regular rate and rhythm.  No murmur.  No friction rub.   Pulmonary/Chest: Effort normal and breath sounds normal. No accessory muscle usage. No tachypnea. No respiratory distress.   Respirations even and unlabored.  No adventitious sounds of  breathing.   Abdominal: Abdomen is soft. She exhibits no distension. There is no abdominal tenderness.   Soft, nontender, nondistended.   Musculoskeletal:      Cervical back: Normal range of motion.     Neurological: She is alert and oriented to person, place, and time. GCS eye subscore is 4. GCS verbal subscore is 5. GCS motor subscore is 6.   Skin: Skin is warm. Capillary refill takes less than 2 seconds.         ED Course   Procedures  Labs Reviewed   POCT URINE PREGNANCY       Result Value    POC Preg Test, Ur Negative       Acceptable Yes     POCT STREP A MOLECULAR    Molecular Strep A, POC Negative       Acceptable Yes     POCT INFLUENZA A/B MOLECULAR    POC Molecular Influenza A Ag Negative      POC Molecular Influenza B Ag Negative       Acceptable Yes     SARS-COV-2 RDRP GENE    POC Rapid COVID Negative       Acceptable Yes            Imaging Results    None          Medications - No data to display  Medical Decision Making  29-year-old female presenting to the emergency department with upper respiratory symptoms including congestion, sore throat, cough for the last 4 days.  Believes she may have had a fever, it was since resolved.  Denies chest pain, shortness of breath, dyspnea at present.  On exam, she was well-appearing and no acute distress.  Vitals are within acceptable ranges.  Congestion and tonsillar edema noted.  The rest of the physical exam was without concerning findings.      Differential diagnosis includes but is not limited to respiratory infections including COVID, flu, bronchitis, rhinosinusitis, or pneumonia, or noninfectious processes such as asthma, COPD or seasonal allergies.     Patient tested negative for COVID, flu, strep.  UPT was negative.  Presentation is consistent with viral upper respiratory infection.  Considered but doubt pneumonia, ARDS, respiratory failure. Discussed supportive care including alternating Motrin  and Tylenol if a fever develops, other medications as listed below for symptom control, maintaining adequate hydration.  Patient was stable for discharge at this time.    Return precautions were discussed, all patient questions were answered, and the patient was agreeable to the plan of care.  She was discharged home in stable condition and will follow up with her primary care provider or return to the emergency department if her symptoms worsen or do not improve.     Amount and/or Complexity of Data Reviewed  Labs: ordered. Decision-making details documented in ED Course.    Risk  OTC drugs.  Prescription drug management.  Diagnosis or treatment significantly limited by social determinants of health.            Scribe Attestation:   Scribe #1: I performed the above scribed service and the documentation accurately describes the services I performed. I attest to the accuracy of the note.                         I, Yann Garcia PA-C, personally performed the services described in this documentation. All medical record entries made by the scribe were at my direction and in my presence. I have reviewed the chart and agree that the record reflects my personal performance and is accurate and complete.       Clinical Impression:  Final diagnoses:  [J02.9] Viral pharyngitis (Primary)          ED Disposition Condition    Discharge Stable          ED Prescriptions       Medication Sig Dispense Start Date End Date Auth. Provider    fluticasone propionate (FLONASE) 50 mcg/actuation nasal spray 2 sprays (100 mcg total) by Each Nostril route once daily. 15.8 mL 10/11/2024 11/10/2024 Yann Garcia PA-C    cetirizine (ZYRTEC) 10 MG tablet Take 1 tablet (10 mg total) by mouth once daily. 30 tablet 10/11/2024 10/11/2025 Yann Garcia PA-C    benzocaine-menthoL 6-10 mg lozenge Take 1 lozenge by mouth every 2 (two) hours as needed. 36 tablet 10/11/2024 -- Yann Garcia PA-C    dextromethorphan-guaiFENesin  mg/5  ml (ROBITUSSIN-DM)  mg/5 mL liquid Take 5 mLs by mouth every 6 (six) hours as needed (cough). 473 mL 10/11/2024 10/21/2024 Yann Garcia PA-C    benzonatate (TESSALON) 200 MG capsule Take 1 capsule (200 mg total) by mouth 3 (three) times daily as needed for Cough. 30 capsule 10/11/2024 10/21/2024 Yann Garcia PA-C    ibuprofen (ADVIL,MOTRIN) 600 MG tablet Take 1 tablet (600 mg total) by mouth every 6 (six) hours as needed for Pain. 20 tablet 10/11/2024 -- Yann Garcia PA-C    acetaminophen (TYLENOL) 500 MG tablet Take 2 tablets (1,000 mg total) by mouth every 6 (six) hours as needed for Pain. 56 tablet 10/11/2024 10/18/2024 Yann Garcia PA-C          Follow-up Information       Follow up With Specialties Details Why Contact Info    Andrea Vaughn MD Neonatology, Pediatrics Schedule an appointment as soon as possible for a visit  As needed, If symptoms worsen [address] [place] [id number] [phone]               Yann Garcia PA-C  10/11/24 8295

## 2024-10-11 NOTE — DISCHARGE INSTRUCTIONS
